# Patient Record
Sex: MALE | Race: WHITE | Employment: FULL TIME | URBAN - METROPOLITAN AREA
[De-identification: names, ages, dates, MRNs, and addresses within clinical notes are randomized per-mention and may not be internally consistent; named-entity substitution may affect disease eponyms.]

---

## 2017-01-27 ENCOUNTER — TELEPHONE (OUTPATIENT)
Dept: INTERNAL MEDICINE | Age: 52
End: 2017-01-27

## 2017-06-21 ENCOUNTER — OFFICE VISIT (OUTPATIENT)
Dept: ORTHOPEDIC SURGERY | Age: 52
End: 2017-06-21

## 2017-06-21 VITALS — HEIGHT: 75 IN | BODY MASS INDEX: 29.84 KG/M2 | WEIGHT: 240 LBS

## 2017-06-21 DIAGNOSIS — M25.511 PAIN OF RIGHT SHOULDER REGION: Primary | ICD-10-CM

## 2017-06-21 DIAGNOSIS — M75.41 IMPINGEMENT SYNDROME OF SHOULDER, RIGHT: ICD-10-CM

## 2017-06-21 PROBLEM — M75.40 IMPINGEMENT SYNDROME OF SHOULDER: Status: ACTIVE | Noted: 2017-06-21

## 2017-06-21 PROCEDURE — G8417 CALC BMI ABV UP PARAM F/U: HCPCS | Performed by: PHYSICIAN ASSISTANT

## 2017-06-21 PROCEDURE — 99213 OFFICE O/P EST LOW 20 MIN: CPT | Performed by: PHYSICIAN ASSISTANT

## 2017-06-21 PROCEDURE — 73030 X-RAY EXAM OF SHOULDER: CPT | Performed by: PHYSICIAN ASSISTANT

## 2017-06-21 PROCEDURE — G8427 DOCREV CUR MEDS BY ELIG CLIN: HCPCS | Performed by: PHYSICIAN ASSISTANT

## 2017-06-21 PROCEDURE — 1036F TOBACCO NON-USER: CPT | Performed by: PHYSICIAN ASSISTANT

## 2017-06-21 PROCEDURE — 3017F COLORECTAL CA SCREEN DOC REV: CPT | Performed by: PHYSICIAN ASSISTANT

## 2017-06-29 ENCOUNTER — HOSPITAL ENCOUNTER (OUTPATIENT)
Dept: PREADMISSION TESTING | Age: 52
Discharge: HOME | End: 2017-06-30
Attending: NEUROLOGICAL SURGERY | Admitting: NEUROLOGICAL SURGERY

## 2017-06-29 DIAGNOSIS — M54.12 RADICULOPATHY OF CERVICAL REGION: ICD-10-CM

## 2017-08-21 ASSESSMENT — ENCOUNTER SYMPTOMS
GASTROINTESTINAL NEGATIVE: 1
ALLERGIC/IMMUNOLOGIC NEGATIVE: 1
RESPIRATORY NEGATIVE: 1
EYES NEGATIVE: 1

## 2017-08-22 ENCOUNTER — TELEPHONE (OUTPATIENT)
Dept: INTERNAL MEDICINE | Age: 52
End: 2017-08-22

## 2017-08-22 ENCOUNTER — OFFICE VISIT (OUTPATIENT)
Dept: INTERNAL MEDICINE | Age: 52
End: 2017-08-22

## 2017-08-22 VITALS
WEIGHT: 247 LBS | RESPIRATION RATE: 16 BRPM | HEART RATE: 72 BPM | HEIGHT: 75 IN | DIASTOLIC BLOOD PRESSURE: 80 MMHG | BODY MASS INDEX: 30.71 KG/M2 | SYSTOLIC BLOOD PRESSURE: 124 MMHG | TEMPERATURE: 97.4 F

## 2017-08-22 DIAGNOSIS — Z01.818 PRE-OP EXAM: Primary | ICD-10-CM

## 2017-08-22 DIAGNOSIS — E55.9 VITAMIN D DEFICIENCY: ICD-10-CM

## 2017-08-22 DIAGNOSIS — M75.41 IMPINGEMENT SYNDROME OF SHOULDER, RIGHT: ICD-10-CM

## 2017-08-22 DIAGNOSIS — R29.898 RIGHT ARM WEAKNESS: ICD-10-CM

## 2017-08-22 DIAGNOSIS — M48.02 STENOSIS OF CERVICAL SPINE WITH MYELOPATHY (HCC): ICD-10-CM

## 2017-08-22 DIAGNOSIS — G99.2 STENOSIS OF CERVICAL SPINE WITH MYELOPATHY (HCC): ICD-10-CM

## 2017-08-22 LAB
A/G RATIO: 1.4 (ref 1.1–2.2)
ALBUMIN SERPL-MCNC: 4.3 G/DL (ref 3.4–5)
ALP BLD-CCNC: 53 U/L (ref 40–129)
ALT SERPL-CCNC: 27 U/L (ref 10–40)
ANION GAP SERPL CALCULATED.3IONS-SCNC: 14 MMOL/L (ref 3–16)
AST SERPL-CCNC: 23 U/L (ref 15–37)
BASOPHILS ABSOLUTE: 0.1 K/UL (ref 0–0.2)
BASOPHILS RELATIVE PERCENT: 0.8 %
BILIRUB SERPL-MCNC: 1.4 MG/DL (ref 0–1)
BUN BLDV-MCNC: 14 MG/DL (ref 7–20)
CALCIUM SERPL-MCNC: 9.2 MG/DL (ref 8.3–10.6)
CHLORIDE BLD-SCNC: 101 MMOL/L (ref 99–110)
CO2: 25 MMOL/L (ref 21–32)
CREAT SERPL-MCNC: 0.9 MG/DL (ref 0.9–1.3)
EOSINOPHILS ABSOLUTE: 0.2 K/UL (ref 0–0.6)
EOSINOPHILS RELATIVE PERCENT: 3.4 %
GFR AFRICAN AMERICAN: >60
GFR NON-AFRICAN AMERICAN: >60
GLOBULIN: 3 G/DL
GLUCOSE BLD-MCNC: 94 MG/DL (ref 70–99)
HCT VFR BLD CALC: 44.9 % (ref 40.5–52.5)
HEMOGLOBIN: 15.4 G/DL (ref 13.5–17.5)
LYMPHOCYTES ABSOLUTE: 1.4 K/UL (ref 1–5.1)
LYMPHOCYTES RELATIVE PERCENT: 21.2 %
MCH RBC QN AUTO: 29.3 PG (ref 26–34)
MCHC RBC AUTO-ENTMCNC: 34.2 G/DL (ref 31–36)
MCV RBC AUTO: 85.5 FL (ref 80–100)
MONOCYTES ABSOLUTE: 0.6 K/UL (ref 0–1.3)
MONOCYTES RELATIVE PERCENT: 8.9 %
NEUTROPHILS ABSOLUTE: 4.3 K/UL (ref 1.7–7.7)
NEUTROPHILS RELATIVE PERCENT: 65.7 %
PDW BLD-RTO: 13.5 % (ref 12.4–15.4)
PLATELET # BLD: 259 K/UL (ref 135–450)
PMV BLD AUTO: 9 FL (ref 5–10.5)
POTASSIUM SERPL-SCNC: 4.3 MMOL/L (ref 3.5–5.1)
RBC # BLD: 5.25 M/UL (ref 4.2–5.9)
SODIUM BLD-SCNC: 140 MMOL/L (ref 136–145)
TOTAL PROTEIN: 7.3 G/DL (ref 6.4–8.2)
VITAMIN D 25-HYDROXY: 19.5 NG/ML
WBC # BLD: 6.6 K/UL (ref 4–11)

## 2017-08-22 PROCEDURE — 36415 COLL VENOUS BLD VENIPUNCTURE: CPT | Performed by: INTERNAL MEDICINE

## 2017-08-22 PROCEDURE — G8427 DOCREV CUR MEDS BY ELIG CLIN: HCPCS | Performed by: INTERNAL MEDICINE

## 2017-08-22 PROCEDURE — G8417 CALC BMI ABV UP PARAM F/U: HCPCS | Performed by: INTERNAL MEDICINE

## 2017-08-22 PROCEDURE — 1036F TOBACCO NON-USER: CPT | Performed by: INTERNAL MEDICINE

## 2017-08-22 PROCEDURE — 3017F COLORECTAL CA SCREEN DOC REV: CPT | Performed by: INTERNAL MEDICINE

## 2017-08-22 PROCEDURE — 93000 ELECTROCARDIOGRAM COMPLETE: CPT | Performed by: INTERNAL MEDICINE

## 2017-08-22 PROCEDURE — 99215 OFFICE O/P EST HI 40 MIN: CPT | Performed by: INTERNAL MEDICINE

## 2017-08-31 LAB — MRSA SCREEN RT-PCR: NORMAL

## 2017-09-01 ENCOUNTER — HOSPITAL ENCOUNTER (OUTPATIENT)
Dept: PREADMISSION TESTING | Age: 52
Discharge: HOME | End: 2017-09-01
Admitting: NEUROLOGICAL SURGERY

## 2017-09-01 VITALS — WEIGHT: 240 LBS | BODY MASS INDEX: 29.84 KG/M2 | HEIGHT: 75 IN

## 2017-09-01 RX ORDER — CHLORHEXIDINE GLUCONATE 0.12 MG/ML
15 RINSE ORAL 2 TIMES DAILY
Status: CANCELLED | OUTPATIENT
Start: 2017-09-07

## 2017-09-01 RX ORDER — CLINDAMYCIN PHOSPHATE 900 MG/50ML
900 INJECTION INTRAVENOUS ONCE
Status: CANCELLED | OUTPATIENT
Start: 2017-09-01 | End: 2017-09-01

## 2017-09-01 RX ORDER — TRAMADOL HYDROCHLORIDE 50 MG/1
50 TABLET ORAL EVERY 6 HOURS PRN
Status: ON HOLD | COMMUNITY
End: 2017-09-08 | Stop reason: HOSPADM

## 2017-11-13 ENCOUNTER — HOSPITAL ENCOUNTER (OUTPATIENT)
Dept: ENDOSCOPY | Age: 52
Discharge: HOME | End: 2017-11-13
Attending: INTERNAL MEDICINE | Admitting: INTERNAL MEDICINE

## 2017-11-13 VITALS
RESPIRATION RATE: 16 BRPM | TEMPERATURE: 98 F | DIASTOLIC BLOOD PRESSURE: 87 MMHG | HEIGHT: 75 IN | WEIGHT: 235 LBS | BODY MASS INDEX: 29.22 KG/M2 | HEART RATE: 72 BPM | SYSTOLIC BLOOD PRESSURE: 130 MMHG | OXYGEN SATURATION: 96 %

## 2017-11-13 NOTE — PROCEDURES
[facility] [facility]                  [address] [address] [address]                                  PROCEDURE NOTE    PATIENT NAME: Parviz Park                   :        1965  MED REC NO:   [id number]                          ROOM:  ACCOUNT NO:   [de-identified]                          ADMIT DATE: 2017  PROVIDER:     Brandie Ray MD    DATE OF PROCEDURE:  2017    PREOPERATIVE DIAGNOSIS:  Screening for colon cancer. POSTOPERATIVE DIAGNOSES:  1.  Small internal hemorrhoids. 2.  Cecal polyps. OPERATION PERFORMED:  Colonoscopy with biopsy. ANESTHESIA:  Demerol 50 mg and Versed 6 mg. COMPLICATIONS:  None. PROCEDURE:  Informed consent was obtained after explaining the risks of  bleeding, infection, allergy, and perforation, medical and surgical  management as well as non-detection of any colonic neoplasia. Colonoscopy  through the anus advanced to the cecum. Cecal polyp removed with forceps  technique. Ascending, transverse, descending, and sigmoid colon  demonstrates normal mucosal vascular pattern. Retroflexed view in the  rectum revealed small hemorrhoids. Recovery room in stable condition. IMPRESSION AND PLAN:  Dr. Kaleb Eirc will notify the patient by himself to  determine next screening versus surveillance evaluation after pathological  correlation.         Chris Guzman MD    D: 2017 11:24:37       T: 2017 12:01:45     [email or web address]  Job#: [id number]     Doc#: [id number]    CC:  Coy Esposito MD

## 2017-11-13 NOTE — PLAN OF CARE
Verify NPO status [x] IV fluids as support [x] Clear liquids and/or ice chips as ordered  [x] Tolerating clear liquids  [x] Special diet as ordered  [x] D/C IV fluids   ACTIVITY  [x] Assess level of function  [x]  Specified by physician  [x]  Activity as tolerated [x] Position on left side [x] Position on left side and reposition patient as physician ordered [x] Gradually elevate HOB to medinas position  [x] Position changes as patient tolerated  [x] Ambulate as pre-procedure   PATIENT / S.O. EDUCATION [x] Pre, Intra Post-procedure  teaching appropriate to procedure  [x] Conscious Sedation Teaching  [x] Pain Management - instructed [x] Encourage questions  [x] Clarify any concerns [x] Safety devices maintain to  prevent patient injury  [x] Assist and support patient  [x] Observe standard precautions [x] Physician confers with the family/S.O. [x] Short visit from family in RR area  [x] Physician specific post-procedure orders  [x] S/S complications with proper [x]F/U; office visits F/U  [x] Review discharge instructions, medicine to family /S. O. [x] Medication/ special diet information given to family/ S.O. [x]  Copy of discharge instructions givn to family/S.O.   OUTCOME PLANNING  DISCHARGE PLAN [x] Patient/S. O. will verbalize understanding of admission procedure & expectations of outcome in realistic terms   [x] Patient verbalize the role of family/S. O. in plan of care  [x] Patient will have designated responsible person available for discharge.  [x] Patient will demonstrate an  understanding of the planned  procedure and its related procedures and conscious sedation [x] Patient will:  - receive services according to the           standards of care  - receive standards for conscious       sedation  - remain free of injury [x] Patient will:  - have stable vital signs based on Mark Score   - be pain free or tolerable, have no bleeding  - have minimal abdominal distention   -  return to pre-procedure level of orientation   -  tolerate fluid with no nausea and vomiting  -  ambulate as pre-procedure ADL   - verbalize understanding of the discharge instructions     Osmar Null  10:27 AM     Electronically signed by Madelin Turner RN on 11/13/2017 at 11:52 AM

## 2017-11-13 NOTE — H&P
History and Physical / Pre-Sedation Assessment    Patient:  Eber Renee   :   1965     Intended Procedure:   colon    HPI: screen    Nurses notes reviewed and agreed. Medications reviewed  Allergies: Allergies   Allergen Reactions    Bee Venom Anaphylaxis    Penicillins Swelling and Other (See Comments)       Physical Exam:  Vital Signs: /87   Pulse 72   Temp 98 °F (36.7 °C) (Oral)   Resp 16   Ht 6' 3\" (1.905 m)   Wt 235 lb (106.6 kg)   SpO2 96%   BMI 29.37 kg/m²    Airway: Mallampati: I (soft palate, uvula, fauces, tonsillar pillars visible)  Pulmonary:Normal  Cardiac:Normal  Abdomen:Normal    Pre-Procedure Assessment / Plan:  ASA: Class 2 - A normal healthy patient with mild systemic disease  Level of Sedation Plan: Moderate sedation  Post Procedure plan: Return to same level of care    I assessed the patient and find that the patient is in satisfactory condition to proceed with the planned procedure and sedation plan. I have explained the risk, benefits, and alternatives to the procedure; the patient understands and agrees to proceed.        Santiago Points  2017

## 2017-11-16 ENCOUNTER — OFFICE VISIT (OUTPATIENT)
Dept: ORTHOPEDIC SURGERY | Age: 52
End: 2017-11-16

## 2017-11-16 VITALS — HEIGHT: 75 IN | WEIGHT: 235.01 LBS | BODY MASS INDEX: 29.22 KG/M2

## 2017-11-16 DIAGNOSIS — M25.732 CARPAL BOSS OF LEFT WRIST: ICD-10-CM

## 2017-11-16 DIAGNOSIS — M79.642 LEFT HAND PAIN: Primary | ICD-10-CM

## 2017-11-16 PROCEDURE — 3017F COLORECTAL CA SCREEN DOC REV: CPT | Performed by: ORTHOPAEDIC SURGERY

## 2017-11-16 PROCEDURE — G8417 CALC BMI ABV UP PARAM F/U: HCPCS | Performed by: ORTHOPAEDIC SURGERY

## 2017-11-16 PROCEDURE — G8484 FLU IMMUNIZE NO ADMIN: HCPCS | Performed by: ORTHOPAEDIC SURGERY

## 2017-11-16 PROCEDURE — 99243 OFF/OP CNSLTJ NEW/EST LOW 30: CPT | Performed by: ORTHOPAEDIC SURGERY

## 2017-11-16 PROCEDURE — G8427 DOCREV CUR MEDS BY ELIG CLIN: HCPCS | Performed by: ORTHOPAEDIC SURGERY

## 2017-12-08 ENCOUNTER — TELEPHONE (OUTPATIENT)
Dept: ORTHOPEDIC SURGERY | Age: 52
End: 2017-12-08

## 2017-12-08 NOTE — ADDENDUM NOTE
Encounter addended by: Juan C Ruth on: 12/8/2017  4:10 PM<BR>    Actions taken: Letter status changed

## 2017-12-15 ENCOUNTER — TELEPHONE (OUTPATIENT)
Dept: ORTHOPEDIC SURGERY | Age: 52
End: 2017-12-15

## 2017-12-18 ENCOUNTER — TELEPHONE (OUTPATIENT)
Dept: ORTHOPEDIC SURGERY | Age: 52
End: 2017-12-18

## 2017-12-24 NOTE — H&P
I have reviewed the H&P and examined the patient and find no relevant changes. I have reviewed with the patient and/or family the risks, benefits, and alternatives to the procedure(s).     Trace Cordova 134

## 2017-12-26 ENCOUNTER — HOSPITAL ENCOUNTER (OUTPATIENT)
Dept: SURGERY | Age: 52
Discharge: HOME | End: 2017-12-26
Attending: ORTHOPAEDIC SURGERY | Admitting: ORTHOPAEDIC SURGERY

## 2017-12-26 VITALS
OXYGEN SATURATION: 97 % | RESPIRATION RATE: 14 BRPM | TEMPERATURE: 97.3 F | HEART RATE: 67 BPM | DIASTOLIC BLOOD PRESSURE: 80 MMHG | SYSTOLIC BLOOD PRESSURE: 124 MMHG

## 2017-12-26 RX ORDER — DIPHENHYDRAMINE HYDROCHLORIDE 50 MG/ML
6.25 INJECTION INTRAMUSCULAR; INTRAVENOUS
Status: ACTIVE | OUTPATIENT
Start: 2017-12-26 | End: 2017-12-26

## 2017-12-26 RX ORDER — LABETALOL HYDROCHLORIDE 5 MG/ML
5 INJECTION, SOLUTION INTRAVENOUS
Status: DISCONTINUED | OUTPATIENT
Start: 2017-12-26 | End: 2017-12-27 | Stop reason: HOSPADM

## 2017-12-26 RX ORDER — OXYCODONE HYDROCHLORIDE AND ACETAMINOPHEN 5; 325 MG/1; MG/1
2 TABLET ORAL PRN
Status: ACTIVE | OUTPATIENT
Start: 2017-12-26 | End: 2017-12-26

## 2017-12-26 RX ORDER — HYDRALAZINE HYDROCHLORIDE 20 MG/ML
5 INJECTION INTRAMUSCULAR; INTRAVENOUS EVERY 30 MIN PRN
Status: DISCONTINUED | OUTPATIENT
Start: 2017-12-26 | End: 2017-12-27 | Stop reason: HOSPADM

## 2017-12-26 RX ORDER — OXYCODONE HYDROCHLORIDE AND ACETAMINOPHEN 5; 325 MG/1; MG/1
1 TABLET ORAL PRN
Status: ACTIVE | OUTPATIENT
Start: 2017-12-26 | End: 2017-12-26

## 2017-12-26 RX ORDER — SODIUM CHLORIDE, SODIUM LACTATE, POTASSIUM CHLORIDE, CALCIUM CHLORIDE 600; 310; 30; 20 MG/100ML; MG/100ML; MG/100ML; MG/100ML
INJECTION, SOLUTION INTRAVENOUS CONTINUOUS
Status: DISCONTINUED | OUTPATIENT
Start: 2017-12-26 | End: 2017-12-27 | Stop reason: HOSPADM

## 2017-12-26 RX ORDER — OXYCODONE HYDROCHLORIDE AND ACETAMINOPHEN 5; 325 MG/1; MG/1
1 TABLET ORAL EVERY 6 HOURS PRN
Qty: 15 TABLET | Refills: 0 | Status: SHIPPED | OUTPATIENT
Start: 2017-12-26 | End: 2018-01-02

## 2017-12-26 RX ORDER — MEPERIDINE HYDROCHLORIDE 50 MG/ML
12.5 INJECTION INTRAMUSCULAR; INTRAVENOUS; SUBCUTANEOUS EVERY 5 MIN PRN
Status: DISCONTINUED | OUTPATIENT
Start: 2017-12-26 | End: 2017-12-27 | Stop reason: HOSPADM

## 2017-12-26 RX ORDER — LIDOCAINE HYDROCHLORIDE 10 MG/ML
2 INJECTION, SOLUTION INFILTRATION; PERINEURAL
Status: ACTIVE | OUTPATIENT
Start: 2017-12-26 | End: 2017-12-26

## 2017-12-26 RX ORDER — ONDANSETRON 2 MG/ML
4 INJECTION INTRAMUSCULAR; INTRAVENOUS EVERY 30 MIN PRN
Status: DISCONTINUED | OUTPATIENT
Start: 2017-12-26 | End: 2017-12-27 | Stop reason: HOSPADM

## 2017-12-26 RX ADMIN — SODIUM CHLORIDE, SODIUM LACTATE, POTASSIUM CHLORIDE, CALCIUM CHLORIDE: 600; 310; 30; 20 INJECTION, SOLUTION INTRAVENOUS at 08:38

## 2017-12-26 ASSESSMENT — PAIN SCALES - GENERAL
PAINLEVEL_OUTOF10: 0

## 2017-12-26 ASSESSMENT — PAIN DESCRIPTION - ORIENTATION: ORIENTATION: LEFT

## 2017-12-26 ASSESSMENT — PAIN DESCRIPTION - LOCATION: LOCATION: HAND

## 2017-12-26 NOTE — PROGRESS NOTES
AXILLARY BRACHIAL PLEXUS BLOCK NOTE      Regional Anesthetic Block requested by surgeon for post-op analgesia    Time performed:  7862-2874    Risks/benefits/options discussed & accepted by patient. Pt. Informed that block will attenuate but not eliminate post-operative pain.     Surgical procedure:  Excision metacarpal osteophyte    Time-out done, site confirmed & marked by surgeon/designee:  Left    IV sedation:  Propofol 50mg in divided doses to pt. comfort    Procedure:  Oxygen @ 3LPM NC                       Pulse oximeter monitoring, Sp02>95% during block                       Alcohol prep, 1% lido 5cc local anesthesia                       Aseptic technique observed                       22g.X50mm Stimuplex needle placed w/o difficulty in                       axillary sheath using U/S guidance                       Median/Radial/Ulnar evoked motor response observed @ 2Hz                       Start mA:   0.95  End mA:  0.60                       Negative aspiration of blood/csf throughout procedure                       20cc 0.25% Bupivacaine injected in 5cc increments                       Pt. tolerated well, block onset confirmed                       No immediate complications      Nam Garcia M.D.

## 2017-12-26 NOTE — ANESTHESIA PRE-OP
Department of Anesthesiology  Preprocedure Note       Name:  Brian Greenfield   Age:  47 y.o.  :  1965                                          MRN:  [id number]         Date:  2017      Surgeon:    Procedure:    Medications prior to admission:   Prior to Admission medications    Not on File       Current medications:    No current outpatient prescriptions on file. Current Facility-Administered Medications   Medication Dose Route Frequency Provider Last Rate Last Dose    lidocaine 1 % injection 2 mL  2 mL Intradermal Once PRN Courtney Adamson MD        lactated ringers infusion   Intravenous Continuous Courtney Adamson  mL/hr at 17 5860      HYDROmorphone (DILAUDID) injection 0.5 mg  0.5 mg Intravenous Q10 Min PRN Courtney Adamson MD        HYDROmorphone (DILAUDID) injection 0.5 mg  0.5 mg Intravenous Q5 Min PRN Courtney Adamson MD        oxyCODONE-acetaminophen (PERCOCET) 5-325 MG per tablet 1 tablet  1 tablet Oral PRN Courtney Adamson MD        Or    oxyCODONE-acetaminophen (PERCOCET) 5-325 MG per tablet 2 tablet  2 tablet Oral PRN Courtney Adamson MD        diphenhydrAMINE (BENADRYL) injection 6.25 mg  6.25 mg Intravenous Once PRN Courtney Adamson MD        ondansetron [facility]) injection 4 mg  4 mg Intravenous Q30 Min PRN Courtney Adamson MD        labetalol (NORMODYNE;TRANDATE) injection 5 mg  5 mg Intravenous Q15 Min PRN Courtney Adamson MD        hydrALAZINE (APRESOLINE) injection 5 mg  5 mg Intravenous Q30 Min PRN Courtney Adamson MD        meperidine (DEMEROL) injection 12.5 mg  12.5 mg Intravenous Q5 Min PRN Courtney Adamson MD           Allergies:     Allergies   Allergen Reactions    Bee Venom Anaphylaxis    Penicillins Swelling and Other (See Comments)       Problem List:    Patient Active Problem List   Diagnosis Code    Sleep apnea G47.30    RCT (rotator cuff tear) M75.100    Physical exam, annual Z00.00    history of anesthetic complications:   Airway: Mallampati: III     Neck ROM: limited  Comment: Recent Ant/Post cervical fusion   Dental:          Pulmonary:   (+) sleep apnea:                             Cardiovascular:                      Neuro/Psych:               GI/Hepatic/Renal:             Endo/Other:                     Abdominal:           Vascular:                                        Anesthesia Plan      MAC and regional     ASA 2     (Plan to limit neck manipulation in light of recent cervical fusion  Axillary BPB + radial NB will be primary anesthetic)  Induction: intravenous. Anesthetic plan and risks discussed with patient. Plan discussed with CRNA.                   Santiago Yost MD   12/26/2017

## 2017-12-26 NOTE — PROGRESS NOTES
ULTRASOUND GUIDED RADIAL NERVE BLOCK       Regional Anesthetic Block requested by surgeon for post-op analgesia    Time performed:  9779-4019    Risks/benefits/options discussed & accepted by patient. Pt. informed that block will attenuate but not eliminate post-operative pain. Surgical procedure:  Excision metacarpal osteophyte    Time out done, site confirmed & marked by surgeon/designee:  Left    IV sedation:  Propofol 60mg in divided doses to pt. Comfort. Procedure:  Oxygen @ 3LPM NC                      Pulse oximeter monitoring, Sp02>95% during block                      Chloraprep, 1% Lidocaine 5cc local anesthesia                      Aseptic technique observed                      21g.x100mm Stimuplex needle inserted using standard landmarks                      & U/S guidance                      Negative blood aspiration                      10cc 0.25% BPV injected in 5cc increments                      Pt. tolerated well, block onset confirmed                      No immediate complications.       Elin Knight M.D.

## 2018-01-08 ENCOUNTER — OFFICE VISIT (OUTPATIENT)
Dept: ORTHOPEDIC SURGERY | Age: 53
End: 2018-01-08

## 2018-01-08 DIAGNOSIS — M25.732 CARPAL BOSS OF LEFT WRIST: Primary | ICD-10-CM

## 2018-01-08 PROCEDURE — L3908 WHO COCK-UP NONMOLDE PRE OTS: HCPCS | Performed by: ORTHOPAEDIC SURGERY

## 2018-01-08 PROCEDURE — 99024 POSTOP FOLLOW-UP VISIT: CPT | Performed by: ORTHOPAEDIC SURGERY

## 2018-01-08 NOTE — PROGRESS NOTES
Chief Complaint:  Post-Op Check (12/29/2017 s/p excision left index carpal boss)      History of Present of Illness: The patient is overall doing well and reports only some mild occasional discomfort. Examination:  The incision is healing nicely and there is only some very slight postop swelling. He demonstrates almost a full range of motion without hesitation. There is no instability along the wrist nor is there any sign of wound infection. Radiology:     X-rays obtained and reviewed in office:  Views    Location    Impression      Orders Placed This Encounter   Procedures    Titan Wrist Orthosis Brace     Patient was prescribed a Carlita Zepeda Titan Wrist Orthosis. The left wrist will require stabilization / immobilization from this semi-rigid / rigid orthosis to improve their function. The orthosis will assist in protecting the affected area, provide functional support and facilitate healing. The patient was educated and fit by a healthcare professional with expert knowledge and specialization in brace application while under the direct supervision of the treating physician. Verbal and written instructions for the use of and application of this item were provided. They were instructed to contact the office immediately should the brace result in increased pain, decreased sensation, increased swelling or worsening of the condition. Impression:  Encounter Diagnosis   Name Primary?  Carpal boss of left wrist Yes         Treatment Plan: Today we will remove sutures and apply Steri-Strips. We will also supply him with a removable wrist brace to help protect from overuse, especially as he tries to reintroduce back into some light exercise. We did talk about long-term expectations including even the chance of regrowth of the carpal boss. I will see him back on an as-needed basis moving forward. Please note that this transcription was created using voice recognition software.   Any errors

## 2018-07-16 ENCOUNTER — OFFICE VISIT (OUTPATIENT)
Dept: INTERNAL MEDICINE | Age: 53
End: 2018-07-16

## 2018-07-16 VITALS
OXYGEN SATURATION: 97 % | DIASTOLIC BLOOD PRESSURE: 74 MMHG | WEIGHT: 245.6 LBS | HEIGHT: 75 IN | BODY MASS INDEX: 30.54 KG/M2 | HEART RATE: 76 BPM | SYSTOLIC BLOOD PRESSURE: 124 MMHG

## 2018-07-16 DIAGNOSIS — M89.319 CLAVICLE ENLARGEMENT: ICD-10-CM

## 2018-07-16 DIAGNOSIS — N63.10 BREAST MASS, RIGHT: ICD-10-CM

## 2018-07-16 DIAGNOSIS — Z00.00 ROUTINE GENERAL MEDICAL EXAMINATION AT A HEALTH CARE FACILITY: Primary | ICD-10-CM

## 2018-07-16 DIAGNOSIS — R53.83 FATIGUE, UNSPECIFIED TYPE: ICD-10-CM

## 2018-07-16 DIAGNOSIS — M72.2 PLANTAR FASCIITIS OF RIGHT FOOT: ICD-10-CM

## 2018-07-16 DIAGNOSIS — G47.33 OBSTRUCTIVE SLEEP APNEA SYNDROME: ICD-10-CM

## 2018-07-16 PROBLEM — E55.9 VITAMIN D DEFICIENCY: Status: RESOLVED | Noted: 2017-08-22 | Resolved: 2018-07-16

## 2018-07-16 PROBLEM — G99.2 STENOSIS OF CERVICAL SPINE WITH MYELOPATHY (HCC): Status: RESOLVED | Noted: 2017-08-22 | Resolved: 2018-07-16

## 2018-07-16 PROBLEM — M75.40 IMPINGEMENT SYNDROME OF SHOULDER: Status: RESOLVED | Noted: 2017-06-21 | Resolved: 2018-07-16

## 2018-07-16 PROBLEM — R29.898 RIGHT ARM WEAKNESS: Status: RESOLVED | Noted: 2017-08-22 | Resolved: 2018-07-16

## 2018-07-16 PROBLEM — M48.02 STENOSIS OF CERVICAL SPINE WITH MYELOPATHY (HCC): Status: RESOLVED | Noted: 2017-08-22 | Resolved: 2018-07-16

## 2018-07-16 PROBLEM — M25.732 CARPAL BOSS OF LEFT WRIST: Status: RESOLVED | Noted: 2017-11-16 | Resolved: 2018-07-16

## 2018-07-16 PROCEDURE — 82274 ASSAY TEST FOR BLOOD FECAL: CPT | Performed by: INTERNAL MEDICINE

## 2018-07-16 PROCEDURE — 99396 PREV VISIT EST AGE 40-64: CPT | Performed by: INTERNAL MEDICINE

## 2018-07-16 ASSESSMENT — PATIENT HEALTH QUESTIONNAIRE - PHQ9
2. FEELING DOWN, DEPRESSED OR HOPELESS: 0
1. LITTLE INTEREST OR PLEASURE IN DOING THINGS: 0
SUM OF ALL RESPONSES TO PHQ9 QUESTIONS 1 & 2: 0
SUM OF ALL RESPONSES TO PHQ QUESTIONS 1-9: 0

## 2018-07-16 ASSESSMENT — ENCOUNTER SYMPTOMS
EYE REDNESS: 0
COLOR CHANGE: 0
FACIAL SWELLING: 0
CHEST TIGHTNESS: 0
EYE ITCHING: 0
VOMITING: 0
APNEA: 0
COUGH: 0
VOICE CHANGE: 0
EYE DISCHARGE: 0
ABDOMINAL PAIN: 0
WHEEZING: 0
ANAL BLEEDING: 0
EYE PAIN: 0
CONSTIPATION: 0
BACK PAIN: 0
RECTAL PAIN: 0
PHOTOPHOBIA: 0
DIARRHEA: 0
ABDOMINAL DISTENTION: 0
STRIDOR: 0
NAUSEA: 0
SHORTNESS OF BREATH: 0
RHINORRHEA: 0
BLOOD IN STOOL: 0
CHOKING: 0
SORE THROAT: 0
SINUS PRESSURE: 0
TROUBLE SWALLOWING: 0

## 2018-07-16 NOTE — PROGRESS NOTES
Subjective:      Patient ID: Monica Moreno is a 47 y.o. y.o. male. HPI    Monica Moreno is here for a physical.    Chief Complaint   Patient presents with   Lucia Guevara New Doctor       Vitals:    07/16/18 1649   BP: 124/74   Pulse: 76   SpO2: 97%       Wt Readings from Last 3 Encounters:   07/16/18 245 lb 9.6 oz (111.4 kg)   12/20/17 240 lb (108.9 kg)   11/16/17 235 lb 0.2 oz (106.6 kg)       Past Medical History:   Diagnosis Date    Cervical spinal stenosis     WITH MYELOPATHY    Right shoulder injury     Rotator cuff tear, left     Sleep apnea     USES CPAP         Past Surgical History:   Procedure Laterality Date    ANTERIOR CRUCIATE LIGAMENT REPAIR Left 1992    Left leg x 7    CERVICAL LAMINECTOMY  09/08/2017    Anterior corpectomy C5 and 6, posterior laminplasty C4-5-6 and posterior fusion C4-C7    HAND SURGERY  2001    Left hand broken    HERNIA REPAIR  1974    Left groin    HERNIA REPAIR Left     OTHER SURGICAL HISTORY Left 12/26/2017    excision left index base carpal    RHINOPLASTY  1989    THROAT SURGERY  2006    uvula  removed       Family History   Problem Relation Age of Onset    Heart Disease Mother     Parkinsonism Mother     Heart Disease Father        Social History   Substance Use Topics    Smoking status: Never Smoker    Smokeless tobacco: Never Used    Alcohol use 0.5 oz/week     1 Standard drinks or equivalent per week      Comment: rare       Immunization History   Administered Date(s) Administered    Tdap (Boostrix, Adacel) 03/03/2016       Health Maintenance   Topic Date Due    Colon cancer screen colonoscopy  10/19/2016    Shingles Vaccine (1 of 2 - 2 Dose Series) 07/26/2019 (Originally 10/19/2016)    Flu vaccine (1) 09/01/2018    Diabetes screen  09/02/2018    Lipid screen  12/28/2021    DTaP/Tdap/Td vaccine (2 - Td) 03/03/2026    HIV screen  Completed       Discussed over the counter medication with patient.        Discussed use, benefit, and

## 2018-07-17 LAB
CONTROL: NORMAL
HEMOCCULT STL QL: NORMAL

## 2018-08-14 ENCOUNTER — HOSPITAL ENCOUNTER (OUTPATIENT)
Age: 53
Discharge: HOME | End: 2018-08-14
Payer: COMMERCIAL

## 2018-08-14 ENCOUNTER — HOSPITAL ENCOUNTER (OUTPATIENT)
Dept: ULTRASOUND IMAGING | Age: 53
Discharge: HOME | End: 2018-08-14
Payer: COMMERCIAL

## 2018-08-14 ENCOUNTER — HOSPITAL ENCOUNTER (OUTPATIENT)
Dept: GENERAL RADIOLOGY | Age: 53
Discharge: HOME | End: 2018-08-14
Payer: COMMERCIAL

## 2018-08-14 ENCOUNTER — HOSPITAL ENCOUNTER (OUTPATIENT)
Dept: MAMMOGRAPHY | Age: 53
Discharge: HOME | End: 2018-08-14
Payer: COMMERCIAL

## 2018-08-14 DIAGNOSIS — M89.319 CLAVICLE ENLARGEMENT: ICD-10-CM

## 2018-08-14 DIAGNOSIS — N64.59 ABNORMAL BREAST EXAM: ICD-10-CM

## 2018-08-14 DIAGNOSIS — N63.10 BREAST MASS, RIGHT: ICD-10-CM

## 2018-08-14 PROCEDURE — 73000 X-RAY EXAM OF COLLAR BONE: CPT

## 2018-08-14 PROCEDURE — 76642 ULTRASOUND BREAST LIMITED: CPT

## 2018-08-14 PROCEDURE — 77066 DX MAMMO INCL CAD BI: CPT

## 2018-08-14 PROCEDURE — 71120 X-RAY EXAM BREASTBONE 2/>VWS: CPT

## 2018-08-15 ENCOUNTER — TELEPHONE (OUTPATIENT)
Dept: INTERNAL MEDICINE | Age: 53
End: 2018-08-15

## 2018-08-15 DIAGNOSIS — Z00.00 ROUTINE GENERAL MEDICAL EXAMINATION AT A HEALTH CARE FACILITY: ICD-10-CM

## 2018-08-15 LAB
A/G RATIO: 1.4 (ref 1.1–2.2)
ALBUMIN SERPL-MCNC: 4.2 G/DL (ref 3.4–5)
ALP BLD-CCNC: 56 U/L (ref 40–129)
ALT SERPL-CCNC: 22 U/L (ref 10–40)
ANION GAP SERPL CALCULATED.3IONS-SCNC: 12 MMOL/L (ref 3–16)
AST SERPL-CCNC: 20 U/L (ref 15–37)
BILIRUB SERPL-MCNC: 0.9 MG/DL (ref 0–1)
BUN BLDV-MCNC: 17 MG/DL (ref 7–20)
CALCIUM SERPL-MCNC: 9.2 MG/DL (ref 8.3–10.6)
CHLORIDE BLD-SCNC: 102 MMOL/L (ref 99–110)
CHOLESTEROL, TOTAL: 188 MG/DL (ref 0–199)
CO2: 28 MMOL/L (ref 21–32)
CREAT SERPL-MCNC: 1.1 MG/DL (ref 0.9–1.3)
GFR AFRICAN AMERICAN: >60
GFR NON-AFRICAN AMERICAN: >60
GLOBULIN: 3.1 G/DL
GLUCOSE BLD-MCNC: 101 MG/DL (ref 70–99)
HDLC SERPL-MCNC: 42 MG/DL (ref 40–60)
LDL CHOLESTEROL CALCULATED: 115 MG/DL
POTASSIUM SERPL-SCNC: 4.4 MMOL/L (ref 3.5–5.1)
SODIUM BLD-SCNC: 142 MMOL/L (ref 136–145)
TOTAL PROTEIN: 7.3 G/DL (ref 6.4–8.2)
TRIGL SERPL-MCNC: 153 MG/DL (ref 0–150)
TSH SERPL DL<=0.05 MIU/L-ACNC: 3.77 UIU/ML (ref 0.27–4.2)
VLDLC SERPL CALC-MCNC: 31 MG/DL

## 2018-08-15 NOTE — TELEPHONE ENCOUNTER
Geri Mims MD  [facility]             Advise mammogram shows gynecomastia (swelling of breast tissue in male)   Repeat right breast mammogram in 6 months

## 2018-08-17 LAB — VITAMIN D 1,25-DIHYDROXY: 36.3 PG/ML (ref 19.9–79.3)

## 2018-08-20 ENCOUNTER — TELEPHONE (OUTPATIENT)
Dept: ADMINISTRATIVE | Age: 53
End: 2018-08-20

## 2018-08-20 NOTE — TELEPHONE ENCOUNTER
Patient would like his Testosterone results   Pt is wondering if that is why he feels so tired. Patient also stated he cannot wait another 6 months   Pt states it is bugging him and growing and is not waiting that long he would like something done on it soon.

## 2018-11-29 ENCOUNTER — OFFICE VISIT (OUTPATIENT)
Dept: ORTHOPEDIC SURGERY | Age: 53
End: 2018-11-29
Payer: COMMERCIAL

## 2018-11-29 VITALS — BODY MASS INDEX: 30.54 KG/M2 | WEIGHT: 245.59 LBS | HEIGHT: 75 IN

## 2018-11-29 DIAGNOSIS — R20.0 NUMBNESS AND TINGLING IN LEFT HAND: ICD-10-CM

## 2018-11-29 DIAGNOSIS — R20.2 NUMBNESS AND TINGLING IN LEFT HAND: ICD-10-CM

## 2018-11-29 PROCEDURE — 99214 OFFICE O/P EST MOD 30 MIN: CPT | Performed by: ORTHOPAEDIC SURGERY

## 2018-11-29 PROCEDURE — G8417 CALC BMI ABV UP PARAM F/U: HCPCS | Performed by: ORTHOPAEDIC SURGERY

## 2018-11-29 PROCEDURE — G8484 FLU IMMUNIZE NO ADMIN: HCPCS | Performed by: ORTHOPAEDIC SURGERY

## 2018-11-29 PROCEDURE — G8427 DOCREV CUR MEDS BY ELIG CLIN: HCPCS | Performed by: ORTHOPAEDIC SURGERY

## 2018-11-29 PROCEDURE — 3017F COLORECTAL CA SCREEN DOC REV: CPT | Performed by: ORTHOPAEDIC SURGERY

## 2018-11-29 PROCEDURE — 1036F TOBACCO NON-USER: CPT | Performed by: ORTHOPAEDIC SURGERY

## 2020-01-22 ENCOUNTER — OFFICE VISIT (OUTPATIENT)
Dept: INTERNAL MEDICINE CLINIC | Age: 55
End: 2020-01-22
Payer: COMMERCIAL

## 2020-01-22 VITALS
WEIGHT: 250 LBS | HEART RATE: 63 BPM | SYSTOLIC BLOOD PRESSURE: 124 MMHG | BODY MASS INDEX: 31.45 KG/M2 | OXYGEN SATURATION: 95 % | DIASTOLIC BLOOD PRESSURE: 78 MMHG

## 2020-01-22 PROBLEM — J40 BRONCHITIS: Status: ACTIVE | Noted: 2020-01-22

## 2020-01-22 PROCEDURE — 1036F TOBACCO NON-USER: CPT | Performed by: NURSE PRACTITIONER

## 2020-01-22 PROCEDURE — G8427 DOCREV CUR MEDS BY ELIG CLIN: HCPCS | Performed by: NURSE PRACTITIONER

## 2020-01-22 PROCEDURE — 99213 OFFICE O/P EST LOW 20 MIN: CPT | Performed by: NURSE PRACTITIONER

## 2020-01-22 PROCEDURE — G8417 CALC BMI ABV UP PARAM F/U: HCPCS | Performed by: NURSE PRACTITIONER

## 2020-01-22 PROCEDURE — G8484 FLU IMMUNIZE NO ADMIN: HCPCS | Performed by: NURSE PRACTITIONER

## 2020-01-22 PROCEDURE — 3017F COLORECTAL CA SCREEN DOC REV: CPT | Performed by: NURSE PRACTITIONER

## 2020-01-22 RX ORDER — BENZONATATE 200 MG/1
200 CAPSULE ORAL 3 TIMES DAILY PRN
Qty: 30 CAPSULE | Refills: 0 | Status: SHIPPED | OUTPATIENT
Start: 2020-01-22 | End: 2020-01-29

## 2020-01-22 RX ORDER — METHYLPREDNISOLONE 4 MG/1
TABLET ORAL
Qty: 1 KIT | Refills: 0 | Status: SHIPPED | OUTPATIENT
Start: 2020-01-22 | End: 2020-01-28

## 2020-01-22 ASSESSMENT — ENCOUNTER SYMPTOMS
SHORTNESS OF BREATH: 0
SINUS PRESSURE: 0
RHINORRHEA: 0
DIARRHEA: 0
COUGH: 1
WHEEZING: 0
SORE THROAT: 0
SINUS PAIN: 0
BACK PAIN: 0
ABDOMINAL PAIN: 0
COLOR CHANGE: 0
CONSTIPATION: 0

## 2020-01-22 ASSESSMENT — PATIENT HEALTH QUESTIONNAIRE - PHQ9
SUM OF ALL RESPONSES TO PHQ QUESTIONS 1-9: 0
SUM OF ALL RESPONSES TO PHQ QUESTIONS 1-9: 0
SUM OF ALL RESPONSES TO PHQ9 QUESTIONS 1 & 2: 0
2. FEELING DOWN, DEPRESSED OR HOPELESS: 0
1. LITTLE INTEREST OR PLEASURE IN DOING THINGS: 0

## 2020-01-22 NOTE — PROGRESS NOTES
is warm and dry. Capillary Refill: Capillary refill takes less than 2 seconds. Neurological:      Mental Status: He is alert and oriented to person, place, and time. Psychiatric:         Mood and Affect: Mood normal.         Behavior: Behavior normal.         Assessment:      See Problem List assessment and plan       Plan:       Bronchitis  Wheezing bilaterally   Start medrol dose pack  Tessalon prn   Call if symptoms worsen or fail to improve          Patient engaged in shared decision making. Information given to evaluate options of treatment, understand what is needed and discuss importance of following plan.

## 2020-01-22 NOTE — ASSESSMENT & PLAN NOTE
Wheezing bilaterally   Start medrol dose pack  Tessalon prn   Call if symptoms worsen or fail to improve

## 2020-06-30 ENCOUNTER — OFFICE VISIT (OUTPATIENT)
Dept: ORTHOPEDIC SURGERY | Age: 55
End: 2020-06-30
Payer: COMMERCIAL

## 2020-06-30 VITALS — BODY MASS INDEX: 32.08 KG/M2 | RESPIRATION RATE: 15 BRPM | HEIGHT: 74 IN | WEIGHT: 250 LBS

## 2020-06-30 PROBLEM — S62.639A CLOSED FRACTURE OF TUFT OF DISTAL PHALANX OF FINGER: Status: ACTIVE | Noted: 2020-06-30

## 2020-06-30 PROBLEM — M79.644 FINGER PAIN, RIGHT: Status: ACTIVE | Noted: 2020-06-30

## 2020-06-30 PROCEDURE — 99214 OFFICE O/P EST MOD 30 MIN: CPT | Performed by: ORTHOPAEDIC SURGERY

## 2020-06-30 NOTE — PROGRESS NOTES
Chief Complaint:  Pain (right middle finger pain x 6 weeks)      History of Present of Illness: The patient returns for a different problem today with regards to his right long finger. He states that he was in [place] approximately 6 weeks ago and manually opened and closed his garage door and had a crush injury to the right long finger. He states that the finger appeared significantly flattened from the injury but he really did not have any reported intervention. He is continued to notice some broadening of the finger but has gotten back to most of his activities. Separately, he shares with me that he still feels a generalized weakness down the entire left arm, 3 years out from his neck surgeries. Review of Systems  Pertinent items are noted in HPI  Denies fever, chills, confusion, bowel/bladder active change. Review of systems reviewed from Patient History Form dated on 6/30/20 and available in the patient's chart under the Media tab. Examination:  On exam today he does have some fullness generally about the long finger but no malalignment. There are posttraumatic changes along the nail plate but there is no sign of any paronychia or abscess. The finger is perfused and he does have sensation to the fingertip. He demonstrates a full range of motion with good integrity of the terminal extensor tendon and the profundus. There is no instability at the DIP joint there is normal strength in tendon function to the right long finger. On the opposite left hand and wrist there is a well-healed incision from carpal boss excision without signs of early recurrence. He does have some decreased muscle size along the forearm consistent with prior proximal radiculopathy. Radiology:     X-rays obtained and reviewed in office:  Views 3  Location right long finger  Impression x-rays today demonstrate a comminuted tuft fracture of the right long finger at the distal phalanx.   The DIP joint remains congruent and uninvolved. There may be some very slight dorsal translation of the fragments distally but the overall foundation of the distal phalanx remains intact. Orders Placed This Encounter   Procedures    XR FINGER RIGHT (MIN 2 VIEWS)     Standing Status:   Future     Standing Expiration Date:   6/30/2021       Impression:  Encounter Diagnosis   Name Primary?  Finger pain, right Yes         Treatment Plan:  I reassured the patient that despite his comminuted distal phalanx tuft fracture expect him to have an excellent long-term functional result. He may lose the nail, he may always have some slight broadening, but I do not see any role for reconstructive surgery for his finger at this juncture. Regarding his left arm and hand, if the lingering symptoms are actually starting to worsen I would urge him to initially check back with his neck surgeon. If he does have any concerns about the hand and wrist a simple intervention would be wearing a nighttime cock up brace to protect the carpal tunnel and to protect the area of extensor carpi radialis longus and brevis insertion at his carpal boss site. We will see him back on an as-needed basis moving forward.

## 2020-10-27 ENCOUNTER — OFFICE VISIT (OUTPATIENT)
Dept: ORTHOPEDIC SURGERY | Age: 55
End: 2020-10-27
Payer: COMMERCIAL

## 2020-10-27 VITALS — WEIGHT: 250 LBS | BODY MASS INDEX: 32.08 KG/M2 | HEIGHT: 74 IN

## 2020-10-27 PROBLEM — M77.12 LEFT LATERAL EPICONDYLITIS: Status: ACTIVE | Noted: 2020-10-27

## 2020-10-27 PROBLEM — M65.332 TRIGGER FINGER, LEFT MIDDLE FINGER: Status: ACTIVE | Noted: 2020-10-27

## 2020-10-27 PROCEDURE — 99214 OFFICE O/P EST MOD 30 MIN: CPT | Performed by: ORTHOPAEDIC SURGERY

## 2020-10-27 PROCEDURE — 20550 NJX 1 TENDON SHEATH/LIGAMENT: CPT | Performed by: ORTHOPAEDIC SURGERY

## 2020-10-27 RX ORDER — BETAMETHASONE SODIUM PHOSPHATE AND BETAMETHASONE ACETATE 3; 3 MG/ML; MG/ML
6 INJECTION, SUSPENSION INTRA-ARTICULAR; INTRALESIONAL; INTRAMUSCULAR; SOFT TISSUE ONCE
Status: COMPLETED | OUTPATIENT
Start: 2020-10-27 | End: 2020-10-27

## 2020-10-27 RX ORDER — LIDOCAINE HYDROCHLORIDE 10 MG/ML
1 INJECTION, SOLUTION INFILTRATION; PERINEURAL ONCE
Status: COMPLETED | OUTPATIENT
Start: 2020-10-27 | End: 2020-10-27

## 2020-10-27 RX ADMIN — LIDOCAINE HYDROCHLORIDE 1 ML: 10 INJECTION, SOLUTION INFILTRATION; PERINEURAL at 10:37

## 2020-10-27 RX ADMIN — BETAMETHASONE SODIUM PHOSPHATE AND BETAMETHASONE ACETATE 6 MG: 3; 3 INJECTION, SUSPENSION INTRA-ARTICULAR; INTRALESIONAL; INTRAMUSCULAR; SOFT TISSUE at 10:40

## 2020-10-27 NOTE — PROGRESS NOTES
Chief Complaint:  Hand Pain (OPNP LEFT HAND)      History of Present of Illness: The patient returns and has had 2 new separate issues. He has noticed some left lateral elbow pain when he lifts weights and he is also noted some locking of his left long finger. Review of Systems  Pertinent items are noted in HPI  Denies fever, chills, confusion, bowel/bladder active change. Review of systems reviewed from Patient History Form dated on 10/27/20 and available in the patient's chart under the Media tab. Examination:  On exam today the incision from his prior carpal boss excision has healed nicely. He has reproducible tenderness over the left lateral epicondyle and common extensor origin with a positive painful middle finger extension test.  The rest of his elbow is stable. At the hand he has good perfusion and good sensation and provocative testing for carpal tunnel syndrome is negative. However, there is tenderness at the A1 pulley of the left long finger with grade 2 triggering. There is no fixed flexion contracture. Radiology:     X-rays obtained and reviewed in office:  Views    Location     Impression      No orders of the defined types were placed in this encounter. Impression:  Encounter Diagnoses   Name Primary?  Trigger finger, left middle finger     Left lateral epicondylitis          Treatment Plan:  I discussed with him treatment for lateral epicondylitis to the left with conservative care, stretching, lifting modifications, and utilization of a tennis elbow forearm strap which he has at home. With regards to his left long trigger digit I have recommended a cortisone injection. Under sterile conditions, I injected the left long finger at the A1 pulley and flexor tendon sheath with 1% lidocaine and 1 mL of Celestone. Appropriate injection risks and instructions were discussed.     Down the road he can follow-up if he fails to have lasting relief but he understands his tennis elbow will likely take several months to slowly dissipate. Please note that this transcription was created using voice recognition software. Any errors are unintentional and may be due to voice recognition transcription.

## 2023-01-27 ENCOUNTER — TELEPHONE (OUTPATIENT)
Dept: SURGERY | Age: 58
End: 2023-01-27

## 2023-01-27 NOTE — TELEPHONE ENCOUNTER
Received referral for Dr. Storm Pace from Dr. Addison Valdez for anal fissure. Called patient, no answer and voicemail was full so I was unable to leave a message. Will try again.

## 2023-01-31 NOTE — TELEPHONE ENCOUNTER
Received referral for Dr. Parag Qiu from Dr. Ulysses Hamming for anal fissure. Called patient, no answer and voicemail was full so I was unable to leave a message. 2nd attempt to contact patient.

## 2023-02-02 NOTE — TELEPHONE ENCOUNTER
Received referral for Dr. Tamika Mir from Dr. Sara Raza for anal fissure. Called patient, no answer and voicemail was full so I was unable to leave a message. 3rd attempt to contact patient.

## 2024-08-20 ENCOUNTER — OFFICE VISIT (OUTPATIENT)
Dept: SURGERY | Age: 59
End: 2024-08-20
Payer: COMMERCIAL

## 2024-08-20 VITALS
DIASTOLIC BLOOD PRESSURE: 85 MMHG | OXYGEN SATURATION: 98 % | HEART RATE: 76 BPM | SYSTOLIC BLOOD PRESSURE: 116 MMHG | TEMPERATURE: 97.5 F | BODY MASS INDEX: 29.66 KG/M2 | WEIGHT: 231 LBS

## 2024-08-20 DIAGNOSIS — K64.5 THROMBOSED EXTERNAL HEMORRHOID: Primary | ICD-10-CM

## 2024-08-20 PROCEDURE — 99203 OFFICE O/P NEW LOW 30 MIN: CPT | Performed by: SURGERY

## 2024-08-20 NOTE — PROGRESS NOTES
[facility] COLORECTAL SURGERY  [address] [place] [address]  [address]  [place] [address]  Dept: [phone]  Dept Fax: [phone]  Loc: [phone]    Visit Date: 8/20/2024    Velasquez Bailey is a 58 y.o. male who presents today for: New Patient (Ref By Dr. Vu hemorrhoids, denies pain, denies bleeding, denies constipation)      HPI:       Velasquez Bailey is a 58 y.o. male referred to me Dr. Bg Vu for further evaluation guarding new diagnosis of external thrombosed hemorrhoid.  He also tells me it has been there for about 1 month.  He does admit to some heavy lifting and straining recently.  States he is up-to-date on colonoscopies.      Past Medical History:   Diagnosis Date    Cervical spinal stenosis     WITH MYELOPATHY    Right shoulder injury     Rotator cuff tear, left     Sleep apnea     USES CPAP         Past Surgical History:   Procedure Laterality Date    ANTERIOR CRUCIATE LIGAMENT REPAIR Left 1992    Left leg x 7    CERVICAL LAMINECTOMY  09/08/2017    Anterior corpectomy C5 and 6, posterior laminplasty C4-5-6 and posterior fusion C4-C7    HAND SURGERY  2001    Left hand broken    HERNIA REPAIR  1974    Left groin    HERNIA REPAIR Left     OTHER SURGICAL HISTORY Left 12/26/2017    excision left index base carpal    RHINOPLASTY  1989    THROAT SURGERY  2006    uvula  removed       Cancer-related family history is not on file.    Social History:   Social History     Tobacco Use    Smoking status: Never    Smokeless tobacco: Never   Substance Use Topics    Alcohol use: Yes     Alcohol/week: 0.8 standard drinks of alcohol     Types: 1 Standard drinks or equivalent per week     Comment: rare      Tobacco cessation counseling provided as appropriate.    Date of last Colonoscopy: 11/13/2017   No cologuard on file  Date of last FIT: 7/16/2018   No flexible sigmoidoscopy on file      Objective:     Physical Exam   /85   Pulse 76

## 2024-08-20 NOTE — PATIENT INSTRUCTIONS
the symptoms and do not actually address the underlying problem. Some of these (especially steroid-based creams), can actually cause damage to the anus and skin if used over a longer period of time (more than 3 weeks).    What about external hemorrhoids?    External hemorrhoids can clot or \"thrombose\". This can cause sudden onset of severe pain. Typically the clot will dissolve or push through the skin on its own within 5-7 days. However, if patients are seen within the first 1-3 days of the start of the pain, the clot and external hemorrhoid can be excised (cut) in the office, reducing the duration of pain and reducing healing time. This is typically done with local anesthetic injection.    After an external hemorrhoid heals, typically there is a small skin tag that is left behind. No treatment is necessary for skin tags unless there is interference with hygiene.    When should you call the office?  You have any questions or concerns.  You have excessive bleeding, fever, chills, or inability to urinate.  The office phone # is [phone]  If you are unable to reach the office (outside of normal business hours) and you have any concerns, go to your nearest emergency room.